# Patient Record
Sex: FEMALE | Race: WHITE | NOT HISPANIC OR LATINO | Employment: UNEMPLOYED | URBAN - METROPOLITAN AREA
[De-identification: names, ages, dates, MRNs, and addresses within clinical notes are randomized per-mention and may not be internally consistent; named-entity substitution may affect disease eponyms.]

---

## 2017-03-06 ENCOUNTER — APPOINTMENT (OUTPATIENT)
Dept: INTERNAL MEDICINE | Facility: MEDICAL CENTER | Age: 62
End: 2017-03-06
Payer: MEDICAID

## 2017-09-20 ENCOUNTER — OFFICE VISIT (OUTPATIENT)
Dept: INTERNAL MEDICINE | Facility: MEDICAL CENTER | Age: 62
End: 2017-09-20
Payer: MEDICAID

## 2017-09-20 VITALS
BODY MASS INDEX: 29.09 KG/M2 | DIASTOLIC BLOOD PRESSURE: 88 MMHG | HEART RATE: 66 BPM | SYSTOLIC BLOOD PRESSURE: 150 MMHG | OXYGEN SATURATION: 93 % | TEMPERATURE: 97.4 F | RESPIRATION RATE: 16 BRPM | HEIGHT: 70 IN | WEIGHT: 203.2 LBS

## 2017-09-20 DIAGNOSIS — F41.9 ANXIETY: ICD-10-CM

## 2017-09-20 DIAGNOSIS — I10 ESSENTIAL HYPERTENSION: ICD-10-CM

## 2017-09-20 DIAGNOSIS — Z23 NEED FOR VACCINATION: ICD-10-CM

## 2017-09-20 PROCEDURE — 99213 OFFICE O/P EST LOW 20 MIN: CPT | Mod: GE,25 | Performed by: INTERNAL MEDICINE

## 2017-09-20 PROCEDURE — 90686 IIV4 VACC NO PRSV 0.5 ML IM: CPT | Performed by: INTERNAL MEDICINE

## 2017-09-20 PROCEDURE — 90471 IMMUNIZATION ADMIN: CPT | Performed by: INTERNAL MEDICINE

## 2017-09-20 RX ORDER — LISINOPRIL AND HYDROCHLOROTHIAZIDE 25; 20 MG/1; MG/1
1 TABLET ORAL DAILY
Qty: 30 TAB | Refills: 0 | Status: SHIPPED | OUTPATIENT
Start: 2017-09-20 | End: 2017-10-01 | Stop reason: SDUPTHER

## 2017-09-20 RX ORDER — CITALOPRAM 20 MG/1
20 TABLET ORAL DAILY
Qty: 30 TAB | Refills: 0 | Status: SHIPPED | OUTPATIENT
Start: 2017-09-20 | End: 2017-10-01 | Stop reason: SDUPTHER

## 2017-09-20 ASSESSMENT — PAIN SCALES - GENERAL: PAINLEVEL: NO PAIN

## 2017-09-20 ASSESSMENT — PATIENT HEALTH QUESTIONNAIRE - PHQ9: CLINICAL INTERPRETATION OF PHQ2 SCORE: 0

## 2017-09-20 NOTE — PROGRESS NOTES
Established Patient    Kimberly presents today with the following:    CC: Refill of anxiety and blood pressure medications    HPI: Patient is a 62-year-old female with past medical history of anxiety and hypertension presents today requesting refills of her anxiety and blood pressure medications. Denied any complaints for today. Patient is taking Celexa for her anxiety and lisinopril-hydrochlorothiazide for her hypertension. She is running out of the medications. Her last chemistry panel was more than one year ago.    Patient Active Problem List    Diagnosis Date Noted   • Encounter for preventive health examination 11/22/2016   • Essential hypertension 11/22/2016   • Anxiety 11/22/2016   • Dyslipidemia 11/22/2016       Current Outpatient Prescriptions   Medication Sig Dispense Refill   • aspirin 81 MG tablet Take 81 mg by mouth every day.     • Multiple Vitamin (MULTI VITAMIN DAILY PO) Take  by mouth.     • lisinopril-hydrochlorothiazide (PRINZIDE, ZESTORETIC) 20-25 MG per tablet Take 1 Tab by mouth every day. 30 Tab 0   • citalopram (CELEXA) 20 MG Tab Take 1 Tab by mouth every day. 30 Tab 0   • brimonidine (ALPHAGAN) 0.15 % Solution      • latanoprost (XALATAN) 0.005 % Solution        No current facility-administered medications for this visit.            Review of Systems:     Constitutional: Denies fevers, Denies weight changes  Eyes: Denies changes in vision, no eye pain  Ears/Nose/Throat/Mouth: Denies nasal congestion or sore throat   Cardiovascular: Denies chest pain or palpitations   Respiratory: Denies shortness of breath , Denies cough  Gastrointestinal/Hepatic: Denies abdominal pain, nausea, vomiting, diarrhea or constipation.  Genitourinary: Denies bladder dysfunction, dysuria or frequency  Musculoskeletal/Rheum: Denies  joint pain and swelling   Skin: Denies rash.  Neurological: Denies headache, confusion, memory loss or focal weakness/parasthesias  Psychiatric: denies mood disorder               /88  "  Pulse 66   Temp 36.3 °C (97.4 °F)   Resp 16   Ht 1.778 m (5' 10\")   Wt 92.2 kg (203 lb 3.2 oz)   SpO2 93%   Breastfeeding? No   BMI 29.16 kg/m²     Physical Exam   Constitutional:  Comfortable. No acute distress.   Eyes: Pupils are equal, round, and reactive to light. No scleral icterus.  Neck: Neck supple. No thyromegaly present.   Cardiovascular: Normal rate, regular rhythm and normal heart sounds.  Exam reveals no gallop and no friction rub.  No murmur heard.  Pulmonary/Chest: Lungs clear to ascultation bilaterally. Breath sounds normal. No rhonchi, wheezes or crackles.   Musculoskeletal:   No deformity noted. no edema.   Lymphadenopathy: no cervical adenopathy  Neurological: alert and oriented to person, place, and time. Cranial nerves 2-12 grossly intact. No obvious motor or sensory deficits.  Extremities: No edema. No clubbing. No cyanosis. Distal pulses 2+ bilaterally.  Skin: No Rash. No cyanosis. Nails show no clubbing.      Assessment and Plan    1. Essential hypertension  -Blood pressure during today's visit 150/88.  -Patient denied any symptoms.  -We'll give her one month of her medication, lisinopril-hydrochlorothiazide with zero refills pending chemistry panel result.  - lisinopril-hydrochlorothiazide (PRINZIDE, ZESTORETIC) 20-25 MG per tablet; Take 1 Tab by mouth every day.  Dispense: 30 Tab; Refill: 0  - COMP METABOLIC PANEL; Future    2. Anxiety  -Stable on current treatment.  -We'll continue for now.  - citalopram (CELEXA) 20 MG Tab; Take 1 Tab by mouth every day.  Dispense: 30 Tab; Refill: 0    3. Need for vaccination  -Given flu shot today.  - Flu Quad Inj >3 Year Pre-Filled PF                Signed by: Martinez Saravia M.D.  "

## 2017-09-20 NOTE — PROGRESS NOTES
"Kimberly Scott is a 62 y.o. female here for a non-provider visit for:   FLU    Reason for immunization: Annual Flu Vaccine  Immunization records indicate need for vaccine: Yes, confirmed with [place] [employer]  Minimum interval has been met for this vaccine: Yes  ABN completed: Not Indicated    Order and dose verified by: JUAN DAVID NIÑO Dated  08/07/2015 was given to patient: Yes  All IAC Questionnaire questions were answered \"No.\"    Patient tolerated injection and no adverse effects were observed or reported: Yes    Pt scheduled for next dose in series: Not Indicated  "

## 2017-10-01 DIAGNOSIS — I10 ESSENTIAL HYPERTENSION: ICD-10-CM

## 2017-10-01 DIAGNOSIS — F41.9 ANXIETY: ICD-10-CM

## 2017-10-01 RX ORDER — LISINOPRIL AND HYDROCHLOROTHIAZIDE 25; 20 MG/1; MG/1
1 TABLET ORAL DAILY
Qty: 30 TAB | Refills: 4 | OUTPATIENT
Start: 2017-10-01 | End: 2018-02-26 | Stop reason: SDUPTHER

## 2017-10-01 RX ORDER — CITALOPRAM 20 MG/1
20 TABLET ORAL DAILY
Qty: 30 TAB | Refills: 4 | OUTPATIENT
Start: 2017-10-01 | End: 2018-02-26 | Stop reason: SDUPTHER

## 2017-11-08 DIAGNOSIS — I10 ESSENTIAL HYPERTENSION: ICD-10-CM

## 2017-11-08 DIAGNOSIS — F41.9 ANXIETY: ICD-10-CM

## 2017-11-08 RX ORDER — CITALOPRAM 20 MG/1
TABLET ORAL
Qty: 90 TAB | Refills: 0 | Status: SHIPPED | OUTPATIENT
Start: 2017-11-08 | End: 2018-02-12 | Stop reason: SDUPTHER

## 2017-11-08 RX ORDER — LISINOPRIL AND HYDROCHLOROTHIAZIDE 25; 20 MG/1; MG/1
TABLET ORAL
Qty: 90 TAB | Refills: 0 | Status: SHIPPED | OUTPATIENT
Start: 2017-11-08 | End: 2018-02-12 | Stop reason: SDUPTHER

## 2017-11-13 ENCOUNTER — TELEPHONE (OUTPATIENT)
Dept: INTERNAL MEDICINE | Facility: MEDICAL CENTER | Age: 62
End: 2017-11-13

## 2017-11-13 NOTE — TELEPHONE ENCOUNTER
I called and spoke to Libra at the pharmacy. Both medications are ready for  and has been ready for 6 days. Libra will send patient a message that her prescription is ready. I tried to call patient but no answer and not a secured voicemail. I will try again later.

## 2017-11-13 NOTE — TELEPHONE ENCOUNTER
----- Message from Fely Calderon sent at 11/13/2017 10:48 AM PST -----  Regarding: Dr Olivarez/med refill  Patient walked in stating that her medication for celexa and lisinopril were not called in by our office but when I went into patients chart I saw a refill on them done by Dr Olivarez on 11/08/17 informed patient and she stated pharmacy told her we only gave her a 7 day supply. Informed patient our office will follow up on it and call pharmacy.

## 2018-02-12 DIAGNOSIS — F41.9 ANXIETY: ICD-10-CM

## 2018-02-12 DIAGNOSIS — I10 ESSENTIAL HYPERTENSION: ICD-10-CM

## 2018-02-12 NOTE — TELEPHONE ENCOUNTER
Last seen: 09/20/17 by Dr. Saravia  Next appt: 02/26/18 with Dr. Olivarez    Was the patient seen in the last year in this department? Yes   Does patient have an active prescription for medications requested? No   Received Request Via: Pharmacy

## 2018-02-14 RX ORDER — LISINOPRIL AND HYDROCHLOROTHIAZIDE 25; 20 MG/1; MG/1
TABLET ORAL
Qty: 90 TAB | Refills: 0 | Status: SHIPPED | OUTPATIENT
Start: 2018-02-14 | End: 2018-02-26

## 2018-02-14 RX ORDER — CITALOPRAM 20 MG/1
TABLET ORAL
Qty: 90 TAB | Refills: 0 | Status: SHIPPED | OUTPATIENT
Start: 2018-02-14 | End: 2018-02-26

## 2018-02-26 ENCOUNTER — OFFICE VISIT (OUTPATIENT)
Dept: INTERNAL MEDICINE | Facility: MEDICAL CENTER | Age: 63
End: 2018-02-26
Payer: MEDICAID

## 2018-02-26 VITALS
SYSTOLIC BLOOD PRESSURE: 128 MMHG | HEART RATE: 65 BPM | HEIGHT: 70 IN | TEMPERATURE: 98.6 F | OXYGEN SATURATION: 96 % | WEIGHT: 204.4 LBS | DIASTOLIC BLOOD PRESSURE: 79 MMHG | BODY MASS INDEX: 29.26 KG/M2

## 2018-02-26 DIAGNOSIS — I10 ESSENTIAL HYPERTENSION: ICD-10-CM

## 2018-02-26 DIAGNOSIS — F41.9 ANXIETY: ICD-10-CM

## 2018-02-26 DIAGNOSIS — Z00.00 ENCOUNTER FOR PREVENTIVE HEALTH EXAMINATION: ICD-10-CM

## 2018-02-26 PROCEDURE — 99214 OFFICE O/P EST MOD 30 MIN: CPT | Mod: GC | Performed by: INTERNAL MEDICINE

## 2018-02-26 RX ORDER — LISINOPRIL AND HYDROCHLOROTHIAZIDE 25; 20 MG/1; MG/1
1 TABLET ORAL DAILY
Qty: 30 TAB | Refills: 11 | Status: SHIPPED | OUTPATIENT
Start: 2018-02-26 | End: 2018-07-12 | Stop reason: SDUPTHER

## 2018-02-26 RX ORDER — CITALOPRAM 20 MG/1
20 TABLET ORAL DAILY
Qty: 30 TAB | Refills: 11 | Status: SHIPPED | OUTPATIENT
Start: 2018-02-26 | End: 2018-07-13 | Stop reason: SDUPTHER

## 2018-02-26 ASSESSMENT — PAIN SCALES - GENERAL: PAINLEVEL: NO PAIN

## 2018-02-26 NOTE — PATIENT INSTRUCTIONS
Pap Test  A Pap test is a procedure done in a clinic office to evaluate cells that are on the surface of the cervix. The cervix is the lower portion of the uterus and upper portion of the vagina. For some women, the cervical region has the potential to form cancer. With consistent evaluations by your caregiver, this type of cancer can be prevented.   If a Pap test is abnormal, it is most often a result of a previous exposure to human papillomavirus (HPV). HPV is a virus that can infect the cells of the cervix and cause dysplasia. Dysplasia is where the cells no longer look normal. If a woman has been diagnosed with high-grade or severe dysplasia, they are at higher risk of developing cervical cancer. People diagnosed with low-grade dysplasia should still be seen by their caregiver because there is a small chance that low-grade dysplasia could develop into cancer.   LET YOUR CAREGIVER KNOW ABOUT:  · Recent sexually transmitted infection (STI) you have had.  · Any new sex partners you have had.  · History of previous abnormal Pap tests results.  · History of previous cervical procedures you have had (colposcopy, biopsy, loop electrosurgical excision procedure [LEEP]).  · Concerns you have had regarding unusual vaginal discharge.  · History of pelvic pain.  · Your use of birth control.  BEFORE THE PROCEDURE  · Ask your caregiver when to schedule your Pap test. It is best not to be on your period if your caregiver uses a wooden spatula to collect cells or applies cells to a glass slide. Newer techniques are not so sensitive to the timing of a menstrual cycle.  · Do not douche or have sexual intercourse for 24 hours before the test.    · Do not use vaginal creams or tampons for 24 hours before the test.    · Empty your bladder just before the test to lessen any discomfort.    PROCEDURE  You will lie on an exam table with your feet in stirrups. A warm metal or plastic instrument (speculum) is placed in your vagina. This  instrument allows your caregiver to see the inside of your vagina and look at your cervix. A small, plastic brush or wooden spatula is then used to collect cervical cells. These cells are placed in a lab specimen container. The cells are looked at under a microscope. A specialist will determine if the cells are normal.   AFTER THE PROCEDURE  Make sure to get your test results. If your results come back abnormal, you may need further testing.   Document Released: 03/09/2004 Document Revised: 03/11/2013 Document Reviewed: 12/13/2012  [employer]® Patient Information ©2014 [employer].

## 2018-07-12 DIAGNOSIS — F41.9 ANXIETY: ICD-10-CM

## 2018-07-12 DIAGNOSIS — I10 ESSENTIAL HYPERTENSION: ICD-10-CM

## 2018-07-12 NOTE — TELEPHONE ENCOUNTER
Was the patient seen in the last year in this department? Yes     Does patient have an active prescription for medications requested? No     Received Request Via: Patient    Patient has [place] [employer] and Dr. Olivarez is no longer in the system but she will be establishing with new PCP in our office.

## 2018-07-13 RX ORDER — LISINOPRIL AND HYDROCHLOROTHIAZIDE 25; 20 MG/1; MG/1
1 TABLET ORAL DAILY
Qty: 30 TAB | Refills: 11 | Status: SHIPPED | OUTPATIENT
Start: 2018-07-13 | End: 2022-05-24 | Stop reason: SDUPTHER

## 2018-07-13 RX ORDER — CITALOPRAM 20 MG/1
20 TABLET ORAL DAILY
Qty: 30 TAB | Refills: 11 | Status: SHIPPED | OUTPATIENT
Start: 2018-07-13 | End: 2022-05-24 | Stop reason: SDUPTHER

## 2022-05-24 DIAGNOSIS — F41.9 ANXIETY: ICD-10-CM

## 2022-05-24 DIAGNOSIS — I10 ESSENTIAL HYPERTENSION: ICD-10-CM

## 2022-05-24 RX ORDER — LISINOPRIL AND HYDROCHLOROTHIAZIDE 25; 20 MG/1; MG/1
1 TABLET ORAL DAILY
Qty: 30 TABLET | Refills: 11 | Status: SHIPPED | OUTPATIENT
Start: 2022-05-24 | End: 2023-03-27 | Stop reason: SDUPTHER

## 2022-05-24 RX ORDER — CITALOPRAM 20 MG/1
20 TABLET ORAL DAILY
Qty: 30 TABLET | Refills: 11 | Status: SHIPPED | OUTPATIENT
Start: 2022-05-24 | End: 2023-05-19 | Stop reason: SDUPTHER

## 2022-05-24 NOTE — TELEPHONE ENCOUNTER
Last seen:5/12/21 by Dr. Arias  Next appt: 06/06/2022 with Dr. Arias    Was the patient seen in the last year in this department? no  Does patient have an active prescription for medications requested? No   Received Request Via: patient   Patient has been scheduled for follow up on 6/6/22. Patient advised that med refill will only be up until 6/6/22 and if she happens to miss appointment she will not qualify for further refills.

## 2022-06-06 ENCOUNTER — APPOINTMENT (OUTPATIENT)
Dept: INTERNAL MEDICINE | Facility: OTHER | Age: 67
End: 2022-06-06
Payer: COMMERCIAL

## 2022-10-19 ENCOUNTER — OFFICE VISIT (OUTPATIENT)
Dept: INTERNAL MEDICINE | Facility: OTHER | Age: 67
End: 2022-10-19
Payer: COMMERCIAL

## 2022-10-19 VITALS
OXYGEN SATURATION: 95 % | WEIGHT: 198.8 LBS | TEMPERATURE: 97.7 F | DIASTOLIC BLOOD PRESSURE: 69 MMHG | SYSTOLIC BLOOD PRESSURE: 114 MMHG | HEIGHT: 69 IN | BODY MASS INDEX: 29.44 KG/M2 | HEART RATE: 64 BPM

## 2022-10-19 DIAGNOSIS — E66.3 OVERWEIGHT: ICD-10-CM

## 2022-10-19 DIAGNOSIS — Z23 INFLUENZA VACCINATION ADMINISTERED AT CURRENT VISIT: ICD-10-CM

## 2022-10-19 DIAGNOSIS — H40.9 GLAUCOMA, UNSPECIFIED GLAUCOMA TYPE, UNSPECIFIED LATERALITY: ICD-10-CM

## 2022-10-19 DIAGNOSIS — R21 RASH: ICD-10-CM

## 2022-10-19 DIAGNOSIS — E78.5 DYSLIPIDEMIA: ICD-10-CM

## 2022-10-19 DIAGNOSIS — Z13.228 SCREENING FOR METABOLIC DISORDER: ICD-10-CM

## 2022-10-19 PROBLEM — H54.40 BLINDNESS OF ONE EYE: Status: ACTIVE | Noted: 2019-08-09

## 2022-10-19 PROCEDURE — 99214 OFFICE O/P EST MOD 30 MIN: CPT | Mod: 25,GC | Performed by: STUDENT IN AN ORGANIZED HEALTH CARE EDUCATION/TRAINING PROGRAM

## 2022-10-19 PROCEDURE — 90471 IMMUNIZATION ADMIN: CPT | Performed by: STUDENT IN AN ORGANIZED HEALTH CARE EDUCATION/TRAINING PROGRAM

## 2022-10-19 PROCEDURE — 90662 IIV NO PRSV INCREASED AG IM: CPT | Performed by: STUDENT IN AN ORGANIZED HEALTH CARE EDUCATION/TRAINING PROGRAM

## 2022-10-19 RX ORDER — DIPHENHYDRAMINE HCL 25 MG
25 CAPSULE ORAL EVERY 6 HOURS PRN
Qty: 30 CAPSULE | Refills: 0 | Status: SHIPPED | OUTPATIENT
Start: 2022-10-19 | End: 2022-11-18

## 2022-10-19 RX ORDER — NYSTATIN 100000 [USP'U]/G
1 POWDER TOPICAL 3 TIMES DAILY
Qty: 15 G | Refills: 1 | Status: SHIPPED | OUTPATIENT
Start: 2022-10-19

## 2022-10-19 RX ORDER — LATANOPROST 50 UG/ML
1 SOLUTION/ DROPS OPHTHALMIC NIGHTLY
Qty: 2.5 ML | Refills: 4 | Status: SHIPPED | OUTPATIENT
Start: 2022-10-19 | End: 2022-11-18

## 2022-10-19 NOTE — PROGRESS NOTES
"      Office Visit Follow up    Chief Complaint   Patient presents with    Rash     Around left eye        Subjective   Pt has been having erythema around the L eye x3 years. Initially, she was given topical Neosporin for this which controlled her sx.    In the past 1 month, pt ran out of topical Neosporin and she began to experience a L eye \"burning\" pain with associated itching which comes and goes. There is no radiation of the pain beyond her preseptal area. The burning pain seems to improve with topical Neosporin as well as hot towels. She denies placing anything else on her eye except for Neosporin. She does have some blurry vision which she thinks may be from rubbing her eye too much. She denies any vision changes/double vision/photophobia or fever/chills/sweats. She also denies any recent eye trauma.       ROS   -Cardio: denies chest pain, denies palpitations  -Resp: denies SOB, denies cough, denies wheezing  -Abd: denies abd pain, denies N/V    /69 (BP Location: Right arm, Patient Position: Sitting, BP Cuff Size: Adult)   Pulse 64   Temp 36.5 °C (97.7 °F) (Temporal)   Ht 1.753 m (5' 9\")   Wt 90.2 kg (198 lb 12.8 oz)   SpO2 95%   BMI 29.36 kg/m²     Physical Exam   -General: NAD, converses well  -ENT: mallampati score = 2   -Eyes: EOMI, PERRL, there is mild periorbital erythema with no induration/crepitus/fluctuance/warmth. There is also some conjunctival erythema.   -Cardio: no murmurs or gallops  -Resp: lungs CTAB, symmetric expansion  -Abd: soft and nontender, no guarding or rebound      Data   -Hgb = 15.2   -Cr = 0.76   -LDL (2016) = 163     Note: I have reviewed all pertinent labs and diagnostic tests associated with this visit with specific comments listed under the assessment and plan below    Assessment and Plan  Kimberly Scott is a 67 year old female retired  with a h/o Glaucoma, Congenital R Eye blidnness, HTN, CARLA, Hysterectomy (1990s), and ETOH use (1 shot vodka/day).    She " presented with L eye preseptal erythema. CBC/CMP/A1c/Lipid panel = pending.     -----------------------------------------------------------------------------------------------------------------------------------------------------------------------------------------------------------  #L Eye Erythema  (Annular erythema around the eye with an area of central clearing suggests a possible Tinea infection. Physical exam r/o Orbital cellulitis)  -empiric topical Nystatin (started 10/19/22)    #HLD  -pt encouraged on weight loss and low fat diet  -repeat LDL = pending    #CARLA  -c/w home Citalopram    #Glaucoma  -c/w home Latanoprost eye drops    #HTN  -c/w home Lisinopril/HCTZ        #Preventative Health Care  -COVID vaccine = declined  -Flu vaccine = obtained  -next A1c = pending  -next Lipid panel = pending  -next colonoscopy = due ~2027  -next TD booster = due ~2025  -shingles vaccine = declined  -PCV Vaccine = recommended  -pt is not a vegetarian  -next PAP = declined  -next Mammogram = declined  -DEXA scan = declined  -HIV x1 = declined  -Hep C x1 = declined      Code Status = Full Code    Followup: 5 weeks      Oroin Rollins, PGY2  [facility]    Pt has been seen and discussed with the Attending Physician.

## 2022-11-23 ENCOUNTER — TELEPHONE (OUTPATIENT)
Dept: INTERNAL MEDICINE | Facility: OTHER | Age: 67
End: 2022-11-23

## 2023-03-27 DIAGNOSIS — I10 ESSENTIAL HYPERTENSION: ICD-10-CM

## 2023-03-27 RX ORDER — LISINOPRIL AND HYDROCHLOROTHIAZIDE 25; 20 MG/1; MG/1
1 TABLET ORAL DAILY
Qty: 30 TABLET | Refills: 11 | Status: SHIPPED | OUTPATIENT
Start: 2023-03-27

## 2023-05-19 DIAGNOSIS — F41.9 ANXIETY: ICD-10-CM

## 2023-05-21 RX ORDER — CITALOPRAM 20 MG/1
20 TABLET ORAL DAILY
Qty: 30 TABLET | Refills: 11 | Status: SHIPPED | OUTPATIENT
Start: 2023-05-21

## 2024-06-14 DIAGNOSIS — F41.9 ANXIETY: ICD-10-CM

## 2024-06-14 DIAGNOSIS — I10 ESSENTIAL HYPERTENSION: ICD-10-CM

## 2024-06-14 NOTE — TELEPHONE ENCOUNTER
Received request via: Pharmacy    Was the patient seen in the last year in this department? Yes    Does the patient have an active prescription (recently filled or refills available) for medication(s) requested? No    Pharmacy Name: [facility]    Does the patient have custodial Plus and need 100 day supply (blood pressure, diabetes and cholesterol meds only)? Patient does not have SCP

## 2024-06-17 RX ORDER — LISINOPRIL AND HYDROCHLOROTHIAZIDE 25; 20 MG/1; MG/1
1 TABLET ORAL DAILY
Qty: 30 TABLET | Refills: 0 | Status: SHIPPED | OUTPATIENT
Start: 2024-06-17

## 2024-06-17 RX ORDER — CITALOPRAM 20 MG/1
20 TABLET ORAL DAILY
Qty: 30 TABLET | Refills: 0 | Status: SHIPPED | OUTPATIENT
Start: 2024-06-17